# Patient Record
Sex: FEMALE | Race: OTHER | Employment: UNEMPLOYED | URBAN - METROPOLITAN AREA
[De-identification: names, ages, dates, MRNs, and addresses within clinical notes are randomized per-mention and may not be internally consistent; named-entity substitution may affect disease eponyms.]

---

## 2017-08-24 ENCOUNTER — OFFICE VISIT (OUTPATIENT)
Dept: FAMILY MEDICINE CLINIC | Age: 32
End: 2017-08-24

## 2017-08-24 VITALS
WEIGHT: 146 LBS | BODY MASS INDEX: 30.64 KG/M2 | HEIGHT: 58 IN | SYSTOLIC BLOOD PRESSURE: 131 MMHG | TEMPERATURE: 97.9 F | DIASTOLIC BLOOD PRESSURE: 75 MMHG

## 2017-08-24 DIAGNOSIS — L30.9 DERMATITIS: Primary | ICD-10-CM

## 2017-08-24 DIAGNOSIS — J30.89 ENVIRONMENTAL AND SEASONAL ALLERGIES: ICD-10-CM

## 2017-08-24 DIAGNOSIS — G43.909 MIGRAINE WITHOUT STATUS MIGRAINOSUS, NOT INTRACTABLE, UNSPECIFIED MIGRAINE TYPE: ICD-10-CM

## 2017-08-24 RX ORDER — LORATADINE 10 MG/1
10 TABLET ORAL DAILY
Qty: 90 TAB | Refills: 3 | Status: SHIPPED | OUTPATIENT
Start: 2017-08-24

## 2017-08-24 RX ORDER — TRIAMCINOLONE ACETONIDE 1 MG/G
CREAM TOPICAL 2 TIMES DAILY
Qty: 80 G | Refills: 1 | Status: SHIPPED | OUTPATIENT
Start: 2017-08-24

## 2017-08-24 RX ORDER — AMITRIPTYLINE HYDROCHLORIDE 25 MG/1
25 TABLET, FILM COATED ORAL
Qty: 30 TAB | Refills: 5 | Status: SHIPPED | OUTPATIENT
Start: 2017-08-24

## 2017-08-24 NOTE — PATIENT INSTRUCTIONS
Amitriptilina (Por la boca)   Sirve para tratar la depresión. Nupur medicamento es un antidepresivo tricíclico (TCA, por jayla siglas en inglés). Anabel(s) : Elavil   Existen muchas otras marcas de nupur medicamento. Nupur medicamento no debe ser usado cuando:   Nupur medicamento no es adecuado para todas las personas. No lo use si alguna vez tuvo lowell reacción alérgica a la amitriptilina. Forma de usar nupur medicamento:   Tableta  · Hooven jayla medicamentos dandre se le haya indicado. Es probable que sea necesario cambiar thompson dosis varias veces hasta encontrar la que funciona mejor para usted. · [facility] [place] con [facility] Guía del medicamento. Solicite lowell copia con thompson farmacéutico en margarito de no tener la guía. · Guarde el medicamento en un recipiente cerrado a temperatura ambiente y alejado del calor, la humedad y la octavio directa. · Si olvida lowell dosis: Si olvida lowell dosis de thompson medicamento, tómelo lo más pronto posible. Si es jesus la hora para thompson próxima dosis, espere hasta entonces para lance thompson dosis regular. No use medicamento adicional para reponer la dosis olvidada. Medicamentos y [employer] que debe evitar:   Consulte con thompson médico o farmacéutico antes de usar cualquier medicamento, incluyendo los que compra sin receta médica, las vitaminas y los productos herbales. · No use nupur medicamento con cisaprida. No use nupur medicamento junto con un inhibidor de la monoamino oxidasa (IMAO) sin dejar que transcurran 14 días entre un medicamento y el otro. · Algunos alimentos y medicamentos pueden afectar la eficacia de la amitriptilina. Informe a thompson médico si usted está usando cimetidina, disulfiram o guanetidina.  Informe a thompson médico si usted está usando otro medicamento para la depresión (dandre fluoxetina, paroxetina, sertralina), un medicamento con fenotiazina (dandre prometazina, clorpromazina), medicamentos para problemas del ritmo cardíaco (dandre flecainida, propafenona, quinidina), o medicamentos para la tiroides . · Informe a thompson médico si usted [place] cualquier cosa que le provoca sueño. Keenan Pretzel son medicamentos para alergia o medicamentos narcóticos para el dolor y el alcohol. Precauciones mike el uso de nupur medicamento:   · Informarle a thompson médico si usted está embarazada, dando de lactar o, si usted tiene enfermedad del hígado, enfermedad cardíaca, diabetes, glaucoma de ángulo cerrado, convulsiones, problemas de la tiroides o problemas para orinar. · Para algunos niños, adolescentes o adultos jóvenes, nupur medicamento podría aumentar los problemas mentales o emocionales. [place] puede llevar a pensamientos de suicidio y violencia. Hable con thompson médico inmediatamente si usted tiene cualquier cambio de pensamientos o comportamiento que le preocupe. Informe a thompson médico si usted o algún familiar ha tenido un historial de un trastorno bipolar o intentos de suicidio. · Nupur medicamento puede provocarle los siguientes problemas:   ¨ Problemas del ritmo cardíaco  ¨ Niveles altos o bajos de azúcar en la miryam  · Nupur medicamento podría causarle a usted mareos o somnolencia. No maneje un vehículo ni héctor ninguna tarea que pueda ser peligrosa hasta que usted sepa cómo lo afecta nupur medicamento. · No suspenda el uso de nupur medicamento súbitamente. Thompson médico necesitará disminuir thompson dosis poco a poco antes de suspender el medicamento por completo. · Guarde todos los medicamentos fuera del alcance de los niños. Nunca comparta jayla medicamentos con [facility]   Efectos secundarios que pueden presentarse mike el uso de nupur medicamento:   Consulte inmediatamente con el médico si nota cualquiera de estos efectos secundarios:  · Reacción alérgica: Comezón o ronchas, hinchazón del shanita o las teresa, hinchazón u hormigueo en la boca o garganta, opresión en el pecho, dificultad para respirar  · Ansiedad, inquietud, marci o escuchar cosas que no existen  · Dolor de pecho, dificultad para respirar  · Ritmo cardíaco acelerado, golpeante o irregular  · Sensación de un exceso de euforia o energía que lo habitual, pensamientos acelerados, dificultad para dormir  · Desvanecimientos, mareos o desmayos  · Convulsiones  · Pensamientos de hacerse daño a sí mismo o a otros, comportamiento inusual  · Sangrado o moretones inusuales  Consulte con el médico si nota los siguientes efectos secundarios menos graves:   · Visión borrosa, boca seca, fiebre  · Cambio en la cantidad y frecuencia con la que orina  · Estreñimiento, diarrea, náuseas, vómitos  · [employer], somnolencia  · Problemas sexuales  Consulte con el médico si nota otros efectos secundarios que kat son causados por lea medicamento. Llame a wilson médico para consultarle Penny. Usted puede notificar jayla efectos secundarios al FDA al [id number]. © 2017 [address] Jamison Information is for End User's use only and may not be sold, redistributed or otherwise used for commercial purposes. Esta información es sólo para uso en educación. Wislon intención no es darle un consejo médico sobre enfermedades o tratamientos. Colsulte con wilson Peg Roslyn farmacéutico antes de seguir cualquier régimen médico para saber si es seguro y efectivo para usted. Dermatitis: Instrucciones de cuidado - [ Dermatitis: Care Instructions ]  Instrucciones de cuidado  Dermatitis es el nombre general de cualquier salpullido o inflamación de la piel. Los distintos tipos de dermatitis causan diferentes tipos de salpullido. [address] causas comunes del salpullido se encuentran los medicamentos nuevos, las plantas (dandre el zumaque venenoso o la hiedra venenosa), el calor y el estrés. Ciertas enfermedades también pueden causar un salpullido. Ginette reacción alérgica a algo que toca la piel, dandre el látex, el níquel o la hiedra venenosa, se llama dermatitis de contacto.  La dermatitis de contacto también puede estar causada por algo que irrita la piel, dandre la [facility] Cranker. Nupur tipo de salpullido no se puede transmitir de Cinthia Fitzpatrickzanesarina persona a otra. La duración del salpullido depende de thompson causa. El salpullido puede durar unos días o meses. La atención de seguimiento es lowell parte clave de thompson tratamiento y seguridad. Asegúrese de hacer y acudir a todas las citas, y llame a thompson médico si está teniendo problemas. También es lowell buena idea saber los resultados de jayla exámenes y mantener lowell lista de los medicamentos que huma. ¿Cómo puede cuidarse en el hogar? · No se rasque el salpullido. Mantenga las uñas cortas y [place]. O use guantes si esto le ayuda a no rascarse. · Lávese la santino solo con agua. Seque la santino con toques suaves de toalla. · Colóquese paños húmedos y fríos sobre el salpullido para reducir la comezón. · Manténgase fresco y evite el sol. · Deje el salpullido en contacto con el aire tanto dandre sea posible. · Si el salpullido le da comezón, use crema de hidrocortisona. Siga las instrucciones de la etiqueta. La loción de calamina podría ayudar en el margarito del salpullido causado por plantas. · Ajo un antihistamínico de venta [place], dandre difenhidramina (Benadryl) o loratadina (Claritin), para aliviar la comezón. Joelle y siga todas las indicaciones de la [place]. · Si thompson médico le recetó lowell crema, úsela según las indicaciones. Si thompson médico le recetó un medicamento, tómelo exactamente según las indicaciones. ¿Cuándo debe pedir ayuda? Llame a thompson médico ahora mismo o busque atención médica inmediata si:  · Tiene síntomas de infección, tales dandre:  ¨ Aumento del dolor, la hinchazón, la temperatura o el enrojecimiento. ¨ Vetas rojizas que salen de la santino. ¨ Pus que sale de la santino. Sg Jj. · Tiene dolor en las articulaciones junto con el salpullido. Preste especial atención a los [facility], y asegúrese de comunicarse con thompson médico si:  · El salpullido está cambiando o empeorando. · No mejora dandre se esperaba.   ¿Dónde puede encontrar más información en inglés? Darlene Rivera a http://mima-vishal.[email or web address] Magdalena Farmer [id number] en la búsqueda para aprender más acerca de \"Dermatitis: Instrucciones de cuidado - [ Dermatitis: Care Instructions ]. \"  Revisado: 13 octubre, 2016  Versión del contenido: 11.3  © 0733-6222 [employer]wise[employer] Inc[employer]. Las instrucciones de cuidado fueron adaptadas bajo licencia por [employer] (which disclaims liability or warranty for this information). Si usted tiene [place] afección médica o sobre estas instrucciones, siempre pregunte a thompson profesional de amol. Healthwise, Incorporated niega toda garantía o responsabilidad por thompson uso de esta información.

## 2017-08-24 NOTE — PROGRESS NOTES
Subjective:     Chief Complaint   Patient presents with    Rash     itchy rash x 5 days      Headache        She  is a 32 y.o. female who presents for evaluation of widespread itching/pruritus. Onset approx 5 days ago and is generalized. No known plant or new food exposure. No new Rx/medication changes. Pt denies any rashes nor skin changes. Report cosmetics and soaps are the same. Attempted relief with Benadryl w/o any noted relief. Reports chronic Hx of allergies. Also notes chronic migraine of h/a since childhood w/ corresponding nausea. Only attempted relief with Tylenol. ROS  Gen - no fever/chills  Resp - no dyspnea or cough  CV - no chest pain or ARGUELLO  Rest per HPI    No past medical history on file. No past surgical history on file. Current Outpatient Prescriptions on File Prior to Visit   Medication Sig Dispense Refill    amitriptyline (ELAVIL) 10 mg tablet Take 1 tablet by mouth nightly. For headaches. Donna 1 tableta cada noche para dolor de [place]. 30 tablet 0    ranitidine (ZANTAC) 150 mg tablet Take 1 tablet by mouth two (2) times a day. Donna 1 tableta 2 veces al hannah para dolor del estomago 60 tablet 0     No current facility-administered medications on file prior to visit.          Objective:     Vitals:    08/24/17 1136   BP: 131/75   Temp: 97.9 °F (36.6 °C)   TempSrc: Oral   Weight: 146 lb (66.2 kg)   Height: 4' 9.97\" (1.472 m)       Physical Examination:  General appearance - alert, well appearing, and in no distress  Eyes -sclera anicteric  Neck - supple, no significant adenopathy, no thyromegaly  Chest - clear to auscultation, no wheezes, rales or rhonchi, symmetric air entry  Heart - normal rate, regular rhythm, normal S1, S2, no murmurs, rubs, clicks or gallops  Neurological - alert, oriented, no focal findings or movement disorder noted  Abdomen-BS present/WNL x 4 quads, non-tender/distended, soft,no organomegaly    Scattered, multi formed erythematous patches noted on ant/post abdomen. No discharge nor broken skin noted. Assessment/ Plan:   Diagnoses and all orders for this visit:    1. Dermatitis  -     loratadine (CLARITIN) 10 mg tablet; Take 1 Tab by mouth daily. -     triamcinolone acetonide (KENALOG) 0.1 % topical cream; Apply  to affected area two (2) times a day. use thin layer    2. Environmental and seasonal allergies  -     loratadine (CLARITIN) 10 mg tablet; Take 1 Tab by mouth daily. -     triamcinolone acetonide (KENALOG) 0.1 % topical cream; Apply  to affected area two (2) times a day. use thin layer    3. Migraine without status migrainosus, not intractable, unspecified migraine type  -     amitriptyline (ELAVIL) 25 mg tablet; Take 1 Tab by mouth nightly. Start daily claritin and topical PRN triamcinolone for itch relief. Given Hx, suspect this is likely exacerbation of chronic allergies  Vs reaction to specific food/med nor infectious process. QHS Elavil for migraines. Taper up 1/2 tab every 5-6 days for effect, noting common SE of sedation. PRN OTC advil for acute relief. RTC PRN if no improvement in next 1-2 months. I have discussed the diagnosis with the patient and the intended plan as seen in the above orders. The patient has received an after-visit summary and questions were answered concerning future plans. I have discussed medication side effects and warnings with the patient as well. The patient verbalizes understanding and agreement with the plan. Follow-up Disposition:  Return if symptoms worsen or fail to improve.

## 2017-08-24 NOTE — PROGRESS NOTES
Patient seen for discharge with assistance from jae Gonzalez. We reviewed the AVS, prescriptions, pharmacy location and the GoodRx coupon card, and the provider's recommendation for OTC Advil for headaches. The patient expressed understanding.  Meryl Gutierrez RN

## 2017-08-24 NOTE — PROGRESS NOTES
Coordination of Care  1. Have you been to the ER, urgent care clinic since your last visit? Hospitalized since your last visit? No    2. Have you seen or consulted any other health care providers outside of the [address] [place] [address] since your last visit? Include any pap smears or colon screening. No    Medications  Medication Reconciliation Performed: no  Patient does not need refills     Learning Assessment Complete?  yes

## 2018-02-01 PROCEDURE — 80053 COMPREHEN METABOLIC PANEL: CPT | Performed by: NURSE PRACTITIONER

## 2018-02-01 PROCEDURE — 80061 LIPID PANEL: CPT | Performed by: NURSE PRACTITIONER

## 2018-02-02 ENCOUNTER — HOSPITAL ENCOUNTER (OUTPATIENT)
Dept: LAB | Age: 33
Discharge: HOME | End: 2018-02-02

## 2018-02-02 LAB
ALBUMIN SERPL-MCNC: 3.9 G/DL (ref 3.5–5)
ALBUMIN/GLOB SERPL: 1.1 {RATIO} (ref 1.1–2.2)
ALP SERPL-CCNC: 112 U/L (ref 45–117)
ALT SERPL-CCNC: 21 U/L (ref 12–78)
ANION GAP SERPL CALC-SCNC: 6 MMOL/L (ref 5–15)
AST SERPL-CCNC: 17 U/L (ref 15–37)
BILIRUB SERPL-MCNC: 0.4 MG/DL (ref 0.2–1)
BUN SERPL-MCNC: 9 MG/DL (ref 6–20)
BUN/CREAT SERPL: 13 (ref 12–20)
CALCIUM SERPL-MCNC: 8.6 MG/DL (ref 8.5–10.1)
CHLORIDE SERPL-SCNC: 105 MMOL/L (ref 97–108)
CHOLEST SERPL-MCNC: 174 MG/DL
CO2 SERPL-SCNC: 28 MMOL/L (ref 21–32)
CREAT SERPL-MCNC: 0.7 MG/DL (ref 0.55–1.02)
GLOBULIN SER CALC-MCNC: 3.6 G/DL (ref 2–4)
GLUCOSE SERPL-MCNC: 83 MG/DL (ref 65–100)
HDLC SERPL-MCNC: 29 MG/DL
HDLC SERPL: 6 {RATIO} (ref 0–5)
LDLC SERPL CALC-MCNC: 109.8 MG/DL (ref 0–100)
LIPID PROFILE,FLP: ABNORMAL
POTASSIUM SERPL-SCNC: 3.9 MMOL/L (ref 3.5–5.1)
PROT SERPL-MCNC: 7.5 G/DL (ref 6.4–8.2)
SODIUM SERPL-SCNC: 139 MMOL/L (ref 136–145)
TRIGL SERPL-MCNC: 176 MG/DL (ref ?–150)
VLDLC SERPL CALC-MCNC: 35.2 MG/DL

## 2020-12-15 ENCOUNTER — HOSPITAL ENCOUNTER (OUTPATIENT)
Dept: LAB | Age: 35
Discharge: HOME | End: 2020-12-15

## 2020-12-15 LAB
BASOPHILS # BLD: 0.1 K/UL (ref 0–0.1)
BASOPHILS NFR BLD: 1 % (ref 0–1)
DIFFERENTIAL METHOD BLD: NORMAL
EOSINOPHIL # BLD: 0.1 K/UL (ref 0–0.4)
EOSINOPHIL NFR BLD: 1 % (ref 0–7)
ERYTHROCYTE [DISTWIDTH] IN BLOOD BY AUTOMATED COUNT: 12.5 % (ref 11.5–14.5)
HCT VFR BLD AUTO: 42.7 % (ref 35–47)
HGB BLD-MCNC: 14.1 G/DL (ref 11.5–16)
IMM GRANULOCYTES # BLD AUTO: 0 K/UL (ref 0–0.04)
IMM GRANULOCYTES NFR BLD AUTO: 0 % (ref 0–0.5)
LYMPHOCYTES # BLD: 2.9 K/UL (ref 0.8–3.5)
LYMPHOCYTES NFR BLD: 28 % (ref 12–49)
MCH RBC QN AUTO: 31.7 PG (ref 26–34)
MCHC RBC AUTO-ENTMCNC: 33 G/DL (ref 30–36.5)
MCV RBC AUTO: 96 FL (ref 80–99)
MONOCYTES # BLD: 0.7 K/UL (ref 0–1)
MONOCYTES NFR BLD: 7 % (ref 5–13)
NEUTS SEG # BLD: 6.5 K/UL (ref 1.8–8)
NEUTS SEG NFR BLD: 63 % (ref 32–75)
NRBC # BLD: 0 K/UL (ref 0–0.01)
NRBC BLD-RTO: 0 PER 100 WBC
PLATELET # BLD AUTO: 308 K/UL (ref 150–400)
PMV BLD AUTO: 11.5 FL (ref 8.9–12.9)
RBC # BLD AUTO: 4.45 M/UL (ref 3.8–5.2)
WBC # BLD AUTO: 10.3 K/UL (ref 3.6–11)

## 2020-12-15 PROCEDURE — 84443 ASSAY THYROID STIM HORMONE: CPT

## 2020-12-15 PROCEDURE — 88175 CYTOPATH C/V AUTO FLUID REDO: CPT

## 2020-12-15 PROCEDURE — 85025 COMPLETE CBC W/AUTO DIFF WBC: CPT

## 2020-12-15 PROCEDURE — 87624 HPV HI-RISK TYP POOLED RSLT: CPT

## 2020-12-16 ENCOUNTER — TRANSCRIBE ORDER (OUTPATIENT)
Dept: SCHEDULING | Age: 35
End: 2020-12-16

## 2020-12-16 DIAGNOSIS — N93.8 DUB (DYSFUNCTIONAL UTERINE BLEEDING): Primary | ICD-10-CM

## 2020-12-16 LAB — TSH SERPL DL<=0.05 MIU/L-ACNC: 1.46 UIU/ML (ref 0.36–3.74)

## 2020-12-23 ENCOUNTER — HOSPITAL ENCOUNTER (OUTPATIENT)
Dept: ULTRASOUND IMAGING | Age: 35
Discharge: HOME | End: 2020-12-23
Attending: FAMILY MEDICINE
Payer: SUBSIDIZED

## 2020-12-23 DIAGNOSIS — N93.8 DUB (DYSFUNCTIONAL UTERINE BLEEDING): ICD-10-CM

## 2020-12-23 PROCEDURE — 76830 TRANSVAGINAL US NON-OB: CPT

## 2020-12-23 PROCEDURE — 76856 US EXAM PELVIC COMPLETE: CPT

## 2021-08-02 ENCOUNTER — HOSPITAL ENCOUNTER (OUTPATIENT)
Dept: LAB | Age: 36
Discharge: HOME | End: 2021-08-02

## 2021-08-02 PROCEDURE — 87491 CHLMYD TRACH DNA AMP PROBE: CPT

## 2021-08-02 PROCEDURE — 87086 URINE CULTURE/COLONY COUNT: CPT

## 2021-08-04 LAB
BACTERIA SPEC CULT: NORMAL
C TRACH RRNA SPEC QL NAA+PROBE: NEGATIVE
N GONORRHOEA RRNA SPEC QL NAA+PROBE: NEGATIVE
PLEASE NOTE:, 188601: NORMAL
SERVICE CMNT-IMP: NORMAL
SPECIMEN SOURCE: NORMAL

## 2022-08-18 ENCOUNTER — HOSPITAL ENCOUNTER (OUTPATIENT)
Dept: LAB | Age: 37
Discharge: HOME | End: 2022-08-18

## 2022-08-18 PROCEDURE — 87186 SC STD MICRODIL/AGAR DIL: CPT

## 2022-08-18 PROCEDURE — 87077 CULTURE AEROBIC IDENTIFY: CPT

## 2022-08-18 PROCEDURE — 87086 URINE CULTURE/COLONY COUNT: CPT

## 2022-08-21 LAB
BACTERIA SPEC CULT: ABNORMAL
CC UR VC: ABNORMAL
SERVICE CMNT-IMP: ABNORMAL

## 2022-11-30 ENCOUNTER — TRANSCRIBE ORDER (OUTPATIENT)
Dept: SCHEDULING | Age: 37
End: 2022-11-30

## 2022-11-30 DIAGNOSIS — R10.9 ABDOMINAL PAIN: Primary | ICD-10-CM

## 2022-12-09 ENCOUNTER — HOSPITAL ENCOUNTER (OUTPATIENT)
Dept: ULTRASOUND IMAGING | Age: 37
End: 2022-12-09
Attending: FAMILY MEDICINE
Payer: SUBSIDIZED

## 2022-12-09 DIAGNOSIS — R10.9 ABDOMINAL PAIN: ICD-10-CM

## 2022-12-09 PROCEDURE — 76705 ECHO EXAM OF ABDOMEN: CPT

## 2022-12-15 ENCOUNTER — HOSPITAL ENCOUNTER (OUTPATIENT)
Dept: LAB | Age: 37
Discharge: HOME | End: 2022-12-15

## 2022-12-15 PROCEDURE — 80053 COMPREHEN METABOLIC PANEL: CPT

## 2022-12-15 PROCEDURE — 85025 COMPLETE CBC W/AUTO DIFF WBC: CPT

## 2022-12-15 PROCEDURE — 84443 ASSAY THYROID STIM HORMONE: CPT

## 2022-12-15 PROCEDURE — 36415 COLL VENOUS BLD VENIPUNCTURE: CPT

## 2022-12-15 PROCEDURE — 80061 LIPID PANEL: CPT

## 2022-12-16 LAB
ALBUMIN SERPL-MCNC: 4 G/DL (ref 3.5–5)
ALBUMIN/GLOB SERPL: 1.2 {RATIO} (ref 1.1–2.2)
ALP SERPL-CCNC: 93 U/L (ref 45–117)
ALT SERPL-CCNC: 25 U/L (ref 12–78)
ANION GAP SERPL CALC-SCNC: 5 MMOL/L (ref 5–15)
AST SERPL-CCNC: 19 U/L (ref 15–37)
BASOPHILS # BLD: 0.1 K/UL (ref 0–0.1)
BASOPHILS NFR BLD: 1 % (ref 0–1)
BILIRUB SERPL-MCNC: 0.4 MG/DL (ref 0.2–1)
BUN SERPL-MCNC: 7 MG/DL (ref 6–20)
BUN/CREAT SERPL: 9 (ref 12–20)
CALCIUM SERPL-MCNC: 8.7 MG/DL (ref 8.5–10.1)
CHLORIDE SERPL-SCNC: 108 MMOL/L (ref 97–108)
CHOLEST SERPL-MCNC: 200 MG/DL
CO2 SERPL-SCNC: 28 MMOL/L (ref 21–32)
CREAT SERPL-MCNC: 0.79 MG/DL (ref 0.55–1.02)
DIFFERENTIAL METHOD BLD: ABNORMAL
EOSINOPHIL # BLD: 0.1 K/UL (ref 0–0.4)
EOSINOPHIL NFR BLD: 2 % (ref 0–7)
ERYTHROCYTE [DISTWIDTH] IN BLOOD BY AUTOMATED COUNT: 13 % (ref 11.5–14.5)
GLOBULIN SER CALC-MCNC: 3.3 G/DL (ref 2–4)
GLUCOSE SERPL-MCNC: 91 MG/DL (ref 65–100)
HCT VFR BLD AUTO: 40.8 % (ref 35–47)
HDLC SERPL-MCNC: 35 MG/DL
HDLC SERPL: 5.7 {RATIO} (ref 0–5)
HGB BLD-MCNC: 13.3 G/DL (ref 11.5–16)
IMM GRANULOCYTES # BLD AUTO: 0.1 K/UL (ref 0–0.04)
IMM GRANULOCYTES NFR BLD AUTO: 1 % (ref 0–0.5)
LDLC SERPL CALC-MCNC: 110.6 MG/DL (ref 0–100)
LYMPHOCYTES # BLD: 3.1 K/UL (ref 0.8–3.5)
LYMPHOCYTES NFR BLD: 40 % (ref 12–49)
MCH RBC QN AUTO: 32.1 PG (ref 26–34)
MCHC RBC AUTO-ENTMCNC: 32.6 G/DL (ref 30–36.5)
MCV RBC AUTO: 98.6 FL (ref 80–99)
MONOCYTES # BLD: 0.5 K/UL (ref 0–1)
MONOCYTES NFR BLD: 6 % (ref 5–13)
NEUTS SEG # BLD: 3.9 K/UL (ref 1.8–8)
NEUTS SEG NFR BLD: 50 % (ref 32–75)
NRBC # BLD: 0 K/UL (ref 0–0.01)
NRBC BLD-RTO: 0 PER 100 WBC
PLATELET # BLD AUTO: 273 K/UL (ref 150–400)
PMV BLD AUTO: 11.2 FL (ref 8.9–12.9)
POTASSIUM SERPL-SCNC: 4.2 MMOL/L (ref 3.5–5.1)
PROT SERPL-MCNC: 7.3 G/DL (ref 6.4–8.2)
RBC # BLD AUTO: 4.14 M/UL (ref 3.8–5.2)
SODIUM SERPL-SCNC: 141 MMOL/L (ref 136–145)
TRIGL SERPL-MCNC: 272 MG/DL (ref ?–150)
TSH SERPL DL<=0.05 MIU/L-ACNC: 2.85 UIU/ML (ref 0.36–3.74)
VLDLC SERPL CALC-MCNC: 54.4 MG/DL
WBC # BLD AUTO: 7.8 K/UL (ref 3.6–11)

## 2023-05-23 RX ORDER — AMITRIPTYLINE HYDROCHLORIDE 25 MG/1
25 TABLET, FILM COATED ORAL
COMMUNITY
Start: 2017-08-24

## 2023-05-23 RX ORDER — RANITIDINE 150 MG/1
150 TABLET ORAL 2 TIMES DAILY
COMMUNITY
Start: 2014-11-01

## 2023-05-23 RX ORDER — LORATADINE 10 MG/1
10 TABLET ORAL DAILY
COMMUNITY
Start: 2017-08-24

## 2023-05-23 RX ORDER — TRIAMCINOLONE ACETONIDE 1 MG/G
CREAM TOPICAL 2 TIMES DAILY
COMMUNITY
Start: 2017-08-24

## 2023-11-28 ENCOUNTER — HOSPITAL ENCOUNTER (OUTPATIENT)
Facility: HOSPITAL | Age: 38
Discharge: HOME | End: 2023-12-01
Payer: SUBSIDIZED

## 2023-11-28 DIAGNOSIS — M25.561 RIGHT KNEE PAIN, UNSPECIFIED CHRONICITY: ICD-10-CM

## 2023-11-28 PROCEDURE — 73562 X-RAY EXAM OF KNEE 3: CPT

## 2024-03-04 ENCOUNTER — HOSPITAL ENCOUNTER (OUTPATIENT)
Facility: HOSPITAL | Age: 39
Setting detail: SPECIMEN
Discharge: HOME | End: 2024-03-07

## 2024-03-04 PROCEDURE — 87661 TRICHOMONAS VAGINALIS AMPLIF: CPT

## 2024-03-04 PROCEDURE — 87491 CHLMYD TRACH DNA AMP PROBE: CPT

## 2024-03-04 PROCEDURE — 87801 DETECT AGNT MULT DNA AMPLI: CPT

## 2024-03-04 PROCEDURE — 88175 CYTOPATH C/V AUTO FLUID REDO: CPT

## 2024-03-04 PROCEDURE — 87591 N.GONORRHOEAE DNA AMP PROB: CPT

## 2024-03-04 PROCEDURE — 87624 HPV HI-RISK TYP POOLED RSLT: CPT

## 2024-03-04 PROCEDURE — 87798 DETECT AGENT NOS DNA AMP: CPT

## 2024-03-06 ENCOUNTER — HOSPITAL ENCOUNTER (OUTPATIENT)
Facility: HOSPITAL | Age: 39
Setting detail: SPECIMEN
Discharge: HOME | End: 2024-03-09

## 2024-03-06 PROCEDURE — 85025 COMPLETE CBC W/AUTO DIFF WBC: CPT

## 2024-03-06 PROCEDURE — 80061 LIPID PANEL: CPT

## 2024-03-06 PROCEDURE — 36415 COLL VENOUS BLD VENIPUNCTURE: CPT

## 2024-03-06 PROCEDURE — 84443 ASSAY THYROID STIM HORMONE: CPT

## 2024-03-06 PROCEDURE — 80053 COMPREHEN METABOLIC PANEL: CPT

## 2024-03-07 LAB
ALBUMIN SERPL-MCNC: 4.1 G/DL (ref 3.5–5)
ALBUMIN/GLOB SERPL: 1.2 (ref 1.1–2.2)
ALP SERPL-CCNC: 94 U/L (ref 45–117)
ALT SERPL-CCNC: 28 U/L (ref 12–78)
ANION GAP SERPL CALC-SCNC: 5 MMOL/L (ref 5–15)
AST SERPL-CCNC: 20 U/L (ref 15–37)
BASOPHILS # BLD: 0 K/UL (ref 0–0.1)
BASOPHILS NFR BLD: 1 % (ref 0–1)
BILIRUB SERPL-MCNC: 0.5 MG/DL (ref 0.2–1)
BUN SERPL-MCNC: 8 MG/DL (ref 6–20)
BUN/CREAT SERPL: 10 (ref 12–20)
CALCIUM SERPL-MCNC: 9.1 MG/DL (ref 8.5–10.1)
CHLORIDE SERPL-SCNC: 108 MMOL/L (ref 97–108)
CHOLEST SERPL-MCNC: 231 MG/DL
CO2 SERPL-SCNC: 26 MMOL/L (ref 21–32)
CREAT SERPL-MCNC: 0.77 MG/DL (ref 0.55–1.02)
DIFFERENTIAL METHOD BLD: NORMAL
EOSINOPHIL # BLD: 0.1 K/UL (ref 0–0.4)
EOSINOPHIL NFR BLD: 2 % (ref 0–7)
ERYTHROCYTE [DISTWIDTH] IN BLOOD BY AUTOMATED COUNT: 12.9 % (ref 11.5–14.5)
GLOBULIN SER CALC-MCNC: 3.4 G/DL (ref 2–4)
GLUCOSE SERPL-MCNC: 88 MG/DL (ref 65–100)
HCT VFR BLD AUTO: 40.1 % (ref 35–47)
HDLC SERPL-MCNC: 40 MG/DL
HDLC SERPL: 5.8 (ref 0–5)
HGB BLD-MCNC: 13.5 G/DL (ref 11.5–16)
IMM GRANULOCYTES # BLD AUTO: 0 K/UL (ref 0–0.04)
IMM GRANULOCYTES NFR BLD AUTO: 0 % (ref 0–0.5)
LDLC SERPL CALC-MCNC: 152 MG/DL (ref 0–100)
LYMPHOCYTES # BLD: 2.4 K/UL (ref 0.8–3.5)
LYMPHOCYTES NFR BLD: 31 % (ref 12–49)
MCH RBC QN AUTO: 31.7 PG (ref 26–34)
MCHC RBC AUTO-ENTMCNC: 33.7 G/DL (ref 30–36.5)
MCV RBC AUTO: 94.1 FL (ref 80–99)
MONOCYTES # BLD: 0.5 K/UL (ref 0–1)
MONOCYTES NFR BLD: 7 % (ref 5–13)
NEUTS SEG # BLD: 4.6 K/UL (ref 1.8–8)
NEUTS SEG NFR BLD: 59 % (ref 32–75)
NRBC # BLD: 0 K/UL (ref 0–0.01)
NRBC BLD-RTO: 0 PER 100 WBC
PLATELET # BLD AUTO: 304 K/UL (ref 150–400)
PMV BLD AUTO: 10.7 FL (ref 8.9–12.9)
POTASSIUM SERPL-SCNC: 4.1 MMOL/L (ref 3.5–5.1)
PROT SERPL-MCNC: 7.5 G/DL (ref 6.4–8.2)
RBC # BLD AUTO: 4.26 M/UL (ref 3.8–5.2)
SODIUM SERPL-SCNC: 139 MMOL/L (ref 136–145)
TRIGL SERPL-MCNC: 195 MG/DL
TSH SERPL DL<=0.05 MIU/L-ACNC: 1.39 UIU/ML (ref 0.36–3.74)
VLDLC SERPL CALC-MCNC: 39 MG/DL
WBC # BLD AUTO: 7.6 K/UL (ref 3.6–11)

## 2024-03-08 LAB
A VAGINAE DNA VAG QL NAA+PROBE: NORMAL SCORE
BVAB2 DNA VAG QL NAA+PROBE: NORMAL SCORE
C ALBICANS DNA VAG QL NAA+PROBE: NEGATIVE
C GLABRATA DNA VAG QL NAA+PROBE: NEGATIVE
C TRACH RRNA SPEC QL NAA+PROBE: NEGATIVE
CANDIDA KRUSEI: NEGATIVE
CANDIDA LUSITANIAE, NAA: NEGATIVE
CANDIDA PARAPSILOSIS/TROPICALIS: NEGATIVE
MEGA1 DNA VAG QL NAA+PROBE: NORMAL SCORE
N GONORRHOEA RRNA SPEC QL NAA+PROBE: NEGATIVE
T VAGINALIS RRNA SPEC QL NAA+PROBE: NEGATIVE

## 2024-03-20 ENCOUNTER — HOSPITAL ENCOUNTER (OUTPATIENT)
Facility: HOSPITAL | Age: 39
Setting detail: SPECIMEN
Discharge: HOME | End: 2024-03-23

## 2024-03-20 PROCEDURE — 87491 CHLMYD TRACH DNA AMP PROBE: CPT

## 2024-03-20 PROCEDURE — 87661 TRICHOMONAS VAGINALIS AMPLIF: CPT

## 2024-03-20 PROCEDURE — 87798 DETECT AGENT NOS DNA AMP: CPT

## 2024-03-20 PROCEDURE — 87801 DETECT AGNT MULT DNA AMPLI: CPT

## 2024-03-20 PROCEDURE — 87591 N.GONORRHOEAE DNA AMP PROB: CPT

## 2024-03-27 ENCOUNTER — HOSPITAL ENCOUNTER (OUTPATIENT)
Facility: HOSPITAL | Age: 39
Discharge: HOME | End: 2024-03-30

## 2024-03-27 DIAGNOSIS — M25.561 RIGHT KNEE PAIN, UNSPECIFIED CHRONICITY: ICD-10-CM

## 2024-03-27 PROCEDURE — 73721 MRI JNT OF LWR EXTRE W/O DYE: CPT

## 2024-12-23 ENCOUNTER — HOSPITAL ENCOUNTER (OUTPATIENT)
Facility: HOSPITAL | Age: 39
Setting detail: SPECIMEN
Discharge: HOME | End: 2024-12-26

## 2024-12-23 PROCEDURE — 87591 N.GONORRHOEAE DNA AMP PROB: CPT

## 2024-12-23 PROCEDURE — 87491 CHLMYD TRACH DNA AMP PROBE: CPT

## 2024-12-26 LAB
C TRACH RRNA SPEC QL NAA+PROBE: NEGATIVE
N GONORRHOEA RRNA SPEC QL NAA+PROBE: NEGATIVE
SPECIMEN SOURCE: NORMAL